# Patient Record
Sex: MALE | URBAN - METROPOLITAN AREA
[De-identification: names, ages, dates, MRNs, and addresses within clinical notes are randomized per-mention and may not be internally consistent; named-entity substitution may affect disease eponyms.]

---

## 2019-10-01 ENCOUNTER — APPOINTMENT (RX ONLY)
Dept: URBAN - METROPOLITAN AREA CLINIC 11 | Facility: CLINIC | Age: 69
Setting detail: DERMATOLOGY
End: 2019-10-01

## 2019-10-01 DIAGNOSIS — D22 MELANOCYTIC NEVI: ICD-10-CM

## 2019-10-01 DIAGNOSIS — D18.0 HEMANGIOMA: ICD-10-CM

## 2019-10-01 DIAGNOSIS — L82.1 OTHER SEBORRHEIC KERATOSIS: ICD-10-CM

## 2019-10-01 DIAGNOSIS — L81.4 OTHER MELANIN HYPERPIGMENTATION: ICD-10-CM

## 2019-10-01 DIAGNOSIS — L21.8 OTHER SEBORRHEIC DERMATITIS: ICD-10-CM

## 2019-10-01 DIAGNOSIS — L82.0 INFLAMED SEBORRHEIC KERATOSIS: ICD-10-CM

## 2019-10-01 DIAGNOSIS — Z85.828 PERSONAL HISTORY OF OTHER MALIGNANT NEOPLASM OF SKIN: ICD-10-CM

## 2019-10-01 PROBLEM — E78.5 HYPERLIPIDEMIA, UNSPECIFIED: Status: ACTIVE | Noted: 2019-10-01

## 2019-10-01 PROBLEM — D22.62 MELANOCYTIC NEVI OF LEFT UPPER LIMB, INCLUDING SHOULDER: Status: ACTIVE | Noted: 2019-10-01

## 2019-10-01 PROBLEM — D18.01 HEMANGIOMA OF SKIN AND SUBCUTANEOUS TISSUE: Status: ACTIVE | Noted: 2019-10-01

## 2019-10-01 PROCEDURE — 17110 DESTRUCTION B9 LES UP TO 14: CPT

## 2019-10-01 PROCEDURE — ? FULL BODY SKIN EXAM

## 2019-10-01 PROCEDURE — ? COUNSELING

## 2019-10-01 PROCEDURE — ? LIQUID NITROGEN

## 2019-10-01 PROCEDURE — 99213 OFFICE O/P EST LOW 20 MIN: CPT | Mod: 25

## 2019-10-01 ASSESSMENT — LOCATION DETAILED DESCRIPTION DERM
LOCATION DETAILED: LEFT MEDIAL MALAR CHEEK
LOCATION DETAILED: DORSAL GLANS
LOCATION DETAILED: DORSAL GLANS
LOCATION DETAILED: LEFT SUPERIOR POSTERIOR NECK
LOCATION DETAILED: LEFT THENAR EMINENCE
LOCATION DETAILED: LEFT ULNAR PALM
LOCATION DETAILED: LEFT CENTRAL POSTAURICULAR SKIN
LOCATION DETAILED: RIGHT MEDIAL MALAR CHEEK

## 2019-10-01 ASSESSMENT — LOCATION SIMPLE DESCRIPTION DERM
LOCATION SIMPLE: NECK
LOCATION SIMPLE: SCALP
LOCATION SIMPLE: LEFT HAND
LOCATION SIMPLE: LEFT CHEEK
LOCATION SIMPLE: RIGHT CHEEK
LOCATION SIMPLE: PENIS
LOCATION SIMPLE: PENIS

## 2019-10-01 ASSESSMENT — LOCATION ZONE DERM
LOCATION ZONE: PENIS
LOCATION ZONE: HAND
LOCATION ZONE: SCALP
LOCATION ZONE: PENIS
LOCATION ZONE: NECK
LOCATION ZONE: FACE

## 2020-10-01 ENCOUNTER — APPOINTMENT (RX ONLY)
Dept: URBAN - METROPOLITAN AREA CLINIC 11 | Facility: CLINIC | Age: 70
Setting detail: DERMATOLOGY
End: 2020-10-01

## 2020-10-01 DIAGNOSIS — L91.8 OTHER HYPERTROPHIC DISORDERS OF THE SKIN: ICD-10-CM

## 2020-10-01 DIAGNOSIS — L81.8 OTHER SPECIFIED DISORDERS OF PIGMENTATION: ICD-10-CM

## 2020-10-01 DIAGNOSIS — D22 MELANOCYTIC NEVI: ICD-10-CM

## 2020-10-01 DIAGNOSIS — L57.0 ACTINIC KERATOSIS: ICD-10-CM

## 2020-10-01 DIAGNOSIS — L82.1 OTHER SEBORRHEIC KERATOSIS: ICD-10-CM

## 2020-10-01 DIAGNOSIS — L81.4 OTHER MELANIN HYPERPIGMENTATION: ICD-10-CM

## 2020-10-01 DIAGNOSIS — D18.0 HEMANGIOMA: ICD-10-CM

## 2020-10-01 DIAGNOSIS — L82.0 INFLAMED SEBORRHEIC KERATOSIS: ICD-10-CM

## 2020-10-01 PROBLEM — D18.01 HEMANGIOMA OF SKIN AND SUBCUTANEOUS TISSUE: Status: ACTIVE | Noted: 2020-10-01

## 2020-10-01 PROBLEM — D22.62 MELANOCYTIC NEVI OF LEFT UPPER LIMB, INCLUDING SHOULDER: Status: ACTIVE | Noted: 2020-10-01

## 2020-10-01 PROBLEM — D40.8 NEOPLASM OF UNCERTAIN BEHAVIOR OF OTHER SPECIFIED MALE GENITAL ORGANS: Status: ACTIVE | Noted: 2020-10-01

## 2020-10-01 PROCEDURE — ? LIQUID NITROGEN

## 2020-10-01 PROCEDURE — 17003 DESTRUCT PREMALG LES 2-14: CPT | Mod: 59

## 2020-10-01 PROCEDURE — ? FULL BODY SKIN EXAM

## 2020-10-01 PROCEDURE — 17000 DESTRUCT PREMALG LESION: CPT | Mod: 59

## 2020-10-01 PROCEDURE — 17110 DESTRUCTION B9 LES UP TO 14: CPT

## 2020-10-01 PROCEDURE — 99214 OFFICE O/P EST MOD 30 MIN: CPT | Mod: 25

## 2020-10-01 PROCEDURE — ? ADDITIONAL NOTES

## 2020-10-01 PROCEDURE — ? COUNSELING

## 2020-10-01 PROCEDURE — ? DEFER

## 2020-10-01 ASSESSMENT — LOCATION DETAILED DESCRIPTION DERM
LOCATION DETAILED: LEFT PROXIMAL LATERAL POSTERIOR THIGH
LOCATION DETAILED: LEFT ANTERIOR MEDIAL PROXIMAL UPPER ARM
LOCATION DETAILED: LEFT ANTERIOR PROXIMAL THIGH
LOCATION DETAILED: LEFT NASAL DORSUM
LOCATION DETAILED: LEFT AXILLARY VAULT
LOCATION DETAILED: LEFT RADIAL DORSAL HAND
LOCATION DETAILED: LEFT INFERIOR POSTAURICULAR SKIN
LOCATION DETAILED: LEFT ULNAR PALM
LOCATION DETAILED: LEFT DORSAL SHAFT OF PENIS
LOCATION DETAILED: RIGHT AXILLARY VAULT
LOCATION DETAILED: RIGHT RADIAL DORSAL HAND
LOCATION DETAILED: LEFT THENAR EMINENCE

## 2020-10-01 ASSESSMENT — LOCATION ZONE DERM
LOCATION ZONE: HAND
LOCATION ZONE: ARM
LOCATION ZONE: NOSE
LOCATION ZONE: AXILLAE
LOCATION ZONE: PENIS
LOCATION ZONE: SCALP
LOCATION ZONE: LEG

## 2020-10-01 ASSESSMENT — LOCATION SIMPLE DESCRIPTION DERM
LOCATION SIMPLE: RIGHT AXILLARY VAULT
LOCATION SIMPLE: LEFT AXILLARY VAULT
LOCATION SIMPLE: PENIS
LOCATION SIMPLE: NOSE
LOCATION SIMPLE: LEFT UPPER ARM
LOCATION SIMPLE: POSTERIOR SCALP
LOCATION SIMPLE: RIGHT HAND
LOCATION SIMPLE: LEFT POSTERIOR THIGH
LOCATION SIMPLE: LEFT THIGH
LOCATION SIMPLE: LEFT HAND

## 2020-10-01 NOTE — PROCEDURE: LIQUID NITROGEN
Post-Care Instructions: I reviewed with the patient in detail post-care instructions. Patient is to wear sunprotection, and avoid picking at any of the treated lesions. Pt may apply Vaseline to crusted or scabbing areas.
Render Post-Care Instructions In Note?: no
Consent: The patient's consent was obtained including but not limited to risks of crusting, scabbing, blistering, scarring, darker or lighter pigmentary change, recurrence, incomplete removal and infection.
Number Of Freeze-Thaw Cycles: 2 freeze-thaw cycles
Duration Of Freeze Thaw-Cycle (Seconds): 0
Detail Level: Detailed
Medical Necessity Clause: This procedure was medically necessary because the lesions that were treated were:
Medical Necessity Information: It is in your best interest to select a reason for this procedure from the list below. All of these items fulfill various CMS LCD requirements except the new and changing color options.

## 2020-10-21 ENCOUNTER — APPOINTMENT (RX ONLY)
Dept: URBAN - METROPOLITAN AREA CLINIC 11 | Facility: CLINIC | Age: 70
Setting detail: DERMATOLOGY
End: 2020-10-21

## 2020-10-21 DIAGNOSIS — L81.8 OTHER SPECIFIED DISORDERS OF PIGMENTATION: ICD-10-CM

## 2020-10-21 PROBLEM — D40.8 NEOPLASM OF UNCERTAIN BEHAVIOR OF OTHER SPECIFIED MALE GENITAL ORGANS: Status: ACTIVE | Noted: 2020-10-21

## 2020-10-21 PROCEDURE — 54100 BIOPSY OF PENIS: CPT

## 2020-10-21 PROCEDURE — ? COUNSELING

## 2020-10-21 PROCEDURE — ? BIOPSY BY SHAVE METHOD

## 2020-10-21 ASSESSMENT — LOCATION ZONE DERM
LOCATION ZONE: PENIS
LOCATION ZONE: PENIS

## 2020-10-21 ASSESSMENT — LOCATION DETAILED DESCRIPTION DERM
LOCATION DETAILED: LEFT DORSAL SHAFT OF PENIS
LOCATION DETAILED: LEFT DORSAL SHAFT OF PENIS

## 2020-10-21 ASSESSMENT — LOCATION SIMPLE DESCRIPTION DERM
LOCATION SIMPLE: PENIS
LOCATION SIMPLE: PENIS